# Patient Record
Sex: FEMALE | Race: WHITE | NOT HISPANIC OR LATINO | URBAN - METROPOLITAN AREA
[De-identification: names, ages, dates, MRNs, and addresses within clinical notes are randomized per-mention and may not be internally consistent; named-entity substitution may affect disease eponyms.]

---

## 2021-06-04 ENCOUNTER — OFFICE VISIT (OUTPATIENT)
Dept: URBAN - METROPOLITAN AREA CLINIC 72 | Facility: CLINIC | Age: 71
End: 2021-06-04
Payer: MEDICARE

## 2021-06-04 ENCOUNTER — WEB ENCOUNTER (OUTPATIENT)
Dept: URBAN - METROPOLITAN AREA CLINIC 72 | Facility: CLINIC | Age: 71
End: 2021-06-04

## 2021-06-04 VITALS
TEMPERATURE: 97.8 F | WEIGHT: 150 LBS | SYSTOLIC BLOOD PRESSURE: 144 MMHG | HEART RATE: 85 BPM | RESPIRATION RATE: 18 BRPM | HEIGHT: 66 IN | DIASTOLIC BLOOD PRESSURE: 84 MMHG | BODY MASS INDEX: 24.11 KG/M2

## 2021-06-04 DIAGNOSIS — R13.10 ESOPHAGEAL DYSPHAGIA: ICD-10-CM

## 2021-06-04 PROCEDURE — 99204 OFFICE O/P NEW MOD 45 MIN: CPT | Performed by: INTERNAL MEDICINE

## 2021-06-04 RX ORDER — LEVOTHYROXINE SODIUM 75 UG/1
1 TABLET IN THE MORNING ON AN EMPTY STOMACH TABLET ORAL ONCE A DAY
Status: ACTIVE | COMMUNITY

## 2021-06-04 RX ORDER — ESTRADIOL 0.04 MG/D
1 PATCH TO SKIN PATCH TRANSDERMAL
Status: ACTIVE | COMMUNITY

## 2021-06-04 RX ORDER — ACYCLOVIR 400 MG/1
1 TABLET TABLET ORAL
Status: ACTIVE | COMMUNITY

## 2021-06-04 RX ORDER — SIMVASTATIN 40 MG/1
1 TABLET IN THE EVENING TABLET, FILM COATED ORAL ONCE A DAY
Status: ACTIVE | COMMUNITY

## 2021-06-04 NOTE — HPI-TODAY'S VISIT:
Mrs. Gordillo is a pleasant 70-year-old female who presents as a new patient.  She has a complaint of food getting stuck in her throat.   she reports that she has had issues with reflux for quite some time and has required dilations in the past.  She was dilated by another local gastroenterologist a few years ago.  She was told that she needed a repeat dilation in a few years, she is now due for that.  In addition she notes that she is having difficulty swallowing, this is happening even with her small medications.  She denies any odynophagia.  She would like to arrange EGD with dilation.  Heartburn or reflux-like symptoms currently.

## 2021-07-02 ENCOUNTER — OFFICE VISIT (OUTPATIENT)
Dept: URBAN - METROPOLITAN AREA MEDICAL CENTER 40 | Facility: MEDICAL CENTER | Age: 71
End: 2021-07-02
Payer: MEDICARE

## 2021-07-02 DIAGNOSIS — K31.7 GASTRIC POLYPOSIS: ICD-10-CM

## 2021-07-02 DIAGNOSIS — K22.10 EROSIVE ESOPHAGITIS: ICD-10-CM

## 2021-07-02 DIAGNOSIS — K29.60 ALKALINE REFLUX GASTRITIS: ICD-10-CM

## 2021-07-02 DIAGNOSIS — R13.19 DYSPHAGIA CAUSING PULMONARY ASPIRATION WITH SWALLOWING: ICD-10-CM

## 2021-07-02 DIAGNOSIS — K21.9 ACID REFLUX DISEASE: ICD-10-CM

## 2021-07-02 PROCEDURE — 43239 EGD BIOPSY SINGLE/MULTIPLE: CPT | Performed by: INTERNAL MEDICINE

## 2021-07-15 ENCOUNTER — OFFICE VISIT (OUTPATIENT)
Dept: URBAN - METROPOLITAN AREA CLINIC 72 | Facility: CLINIC | Age: 71
End: 2021-07-15
Payer: MEDICARE

## 2021-07-15 VITALS
WEIGHT: 147 LBS | RESPIRATION RATE: 19 BRPM | HEART RATE: 56 BPM | HEIGHT: 66 IN | SYSTOLIC BLOOD PRESSURE: 136 MMHG | TEMPERATURE: 97.5 F | DIASTOLIC BLOOD PRESSURE: 73 MMHG | BODY MASS INDEX: 23.63 KG/M2

## 2021-07-15 DIAGNOSIS — Z80.0 FAMILY HISTORY OF COLON CANCER: ICD-10-CM

## 2021-07-15 DIAGNOSIS — K21.00 GASTROESOPHAGEAL REFLUX DISEASE WITH ESOPHAGITIS WITHOUT HEMORRHAGE: ICD-10-CM

## 2021-07-15 DIAGNOSIS — R13.10 ESOPHAGEAL DYSPHAGIA: ICD-10-CM

## 2021-07-15 PROCEDURE — 99214 OFFICE O/P EST MOD 30 MIN: CPT | Performed by: INTERNAL MEDICINE

## 2021-07-15 RX ORDER — LEVOTHYROXINE SODIUM 75 UG/1
1 TABLET IN THE MORNING ON AN EMPTY STOMACH TABLET ORAL ONCE A DAY
Status: ACTIVE | COMMUNITY

## 2021-07-15 RX ORDER — ESTRADIOL 0.04 MG/D
1 PATCH TO SKIN PATCH TRANSDERMAL
Status: ACTIVE | COMMUNITY

## 2021-07-15 RX ORDER — ACYCLOVIR 400 MG/1
1 TABLET TABLET ORAL
Status: ACTIVE | COMMUNITY

## 2021-07-15 RX ORDER — SIMVASTATIN 40 MG/1
1 TABLET IN THE EVENING TABLET, FILM COATED ORAL ONCE A DAY
Status: ACTIVE | COMMUNITY

## 2021-07-15 NOTE — HPI-TODAY'S VISIT:
Mrs. Gordillo returns for follow-up.  She is a pleasant 70-year-old female last seen in her office on 6/4/2021 with dysphagia.  Past medical history of reflux and dysphagia requiring dilations in the past.  Given her symptoms we arranged for EGD with dilation.  EGD was done without difficulty on 7/2/2021 for esophagitis at the GE junction noted to be at 39 cm from the incisors, there were plaque-like lesions suggestive of Candida, these were sampled.  Multiple gastric polyps and mild antral gastritis and a normal duodenum, no dilation was performed.  she reports that she has been taking pantoprazole 40 mg twice a day, this was not listed on her medication list.  She was taking this when she had her endoscopy.  She has been on twice date daily dosing for quite some time.  This was started by her other gastroenterologist.  She reports things are getting better, she is not having as much difficulty swallowing.  She still notes occasional reflux.  Pathology: Unremarkable small bowel, inactive gastritis, fundic gland polyps, active erosive esophagitis with no viral or fungal elements and no intestinal metaplasia.  Her last colonoscopy was in 2017 significant for a colon polyp and diverticulosis, recommended repeat in 5 years, has a family history of colon cancer in her brother.

## 2022-04-20 ENCOUNTER — OFFICE VISIT (OUTPATIENT)
Dept: URBAN - METROPOLITAN AREA CLINIC 72 | Facility: CLINIC | Age: 72
End: 2022-04-20
Payer: MEDICARE

## 2022-04-20 ENCOUNTER — LAB OUTSIDE AN ENCOUNTER (OUTPATIENT)
Dept: URBAN - METROPOLITAN AREA CLINIC 72 | Facility: CLINIC | Age: 72
End: 2022-04-20

## 2022-04-20 VITALS
TEMPERATURE: 98.3 F | DIASTOLIC BLOOD PRESSURE: 76 MMHG | BODY MASS INDEX: 22.82 KG/M2 | WEIGHT: 142 LBS | HEART RATE: 70 BPM | SYSTOLIC BLOOD PRESSURE: 138 MMHG | HEIGHT: 66 IN | RESPIRATION RATE: 18 BRPM

## 2022-04-20 DIAGNOSIS — K21.00 GASTROESOPHAGEAL REFLUX DISEASE WITH ESOPHAGITIS WITHOUT HEMORRHAGE: ICD-10-CM

## 2022-04-20 DIAGNOSIS — Z80.0 FAMILY HISTORY OF COLON CANCER: ICD-10-CM

## 2022-04-20 PROCEDURE — 99214 OFFICE O/P EST MOD 30 MIN: CPT | Performed by: INTERNAL MEDICINE

## 2022-04-20 RX ORDER — ACYCLOVIR 400 MG/1
1 TABLET TABLET ORAL
Status: ACTIVE | COMMUNITY

## 2022-04-20 RX ORDER — LEVOTHYROXINE SODIUM 75 UG/1
1 TABLET IN THE MORNING ON AN EMPTY STOMACH TABLET ORAL ONCE A DAY
Status: ACTIVE | COMMUNITY

## 2022-04-20 RX ORDER — MIRABEGRON 50 MG/1
1 TABLET TABLET, FILM COATED, EXTENDED RELEASE ORAL ONCE A DAY
Status: ACTIVE | COMMUNITY

## 2022-04-20 RX ORDER — SIMVASTATIN 40 MG/1
1 TABLET IN THE EVENING TABLET, FILM COATED ORAL ONCE A DAY
Status: ACTIVE | COMMUNITY

## 2022-04-20 RX ORDER — ESTRADIOL 0.04 MG/D
1 PATCH TO SKIN PATCH TRANSDERMAL
Status: ACTIVE | COMMUNITY

## 2022-04-20 NOTE — HPI-TODAY'S VISIT:
Mrs. Gordillo returns for follow-up.  She was last seen in our office on 7/15/2021.  She has history of GERD with dysphagia requiring dilatations.  EGD done 7/2/2021 had esophagitis at the GE junction and plaque-like lesions suggestive of Candida and multiple gastric antral polyps normal duodenum and no dilatation performed.  She was on pantoprazole at that time.  Erosive esophagitis with no viral or fungal elements appreciated no intestinal metaplasia, biopsies.  Colonoscopy 2017 had colon polyp and diverticulosis recommend repeat in 5 years given family history of colon cancer in her brother.  Returns this today after being hospitalized for atypical chest pain lab work from hospitalization normal CBC normal CMP negative cardiac work-up.  A chest x-ray performed showed no acute findings.   She reports atypical chest pain, again cardiac work-up negative.  Having occasional intermittent dysphagia, increased belching and bloating.  She is taking a PPI although not listed on her medication list today.  Just takes famotidine at night.  She is due for colonoscopy.  She would like to arrange bidirectional endoscopy with possible dilatation.  She is making dietary and lifestyle modifications with some improvement of her symptoms overall.

## 2022-05-25 ENCOUNTER — OFFICE VISIT (OUTPATIENT)
Dept: URBAN - METROPOLITAN AREA MEDICAL CENTER 40 | Facility: MEDICAL CENTER | Age: 72
End: 2022-05-25
Payer: MEDICARE

## 2022-05-25 DIAGNOSIS — K21.9 ACID REFLUX: ICD-10-CM

## 2022-05-25 DIAGNOSIS — K44.9 DIAPHRAGMATIC HERNIA ASSOCIATED WITH MUTATION IN ZFPM2 GENE: ICD-10-CM

## 2022-05-25 DIAGNOSIS — K22.10 EROSIVE ESOPHAGITIS: ICD-10-CM

## 2022-05-25 DIAGNOSIS — R13.19 DYSPHAGIA: ICD-10-CM

## 2022-05-25 DIAGNOSIS — D12.2 ADENOMA OF ASCENDING COLON: ICD-10-CM

## 2022-05-25 DIAGNOSIS — K57.30 ACQUIRED DIVERTICULOSIS OF COLON: ICD-10-CM

## 2022-05-25 DIAGNOSIS — Z80.0 FAMILY HISTORY MALIGNANT NEOPLASM OF BILIARY TRACT: ICD-10-CM

## 2022-05-25 DIAGNOSIS — K31.7 BENIGN POLYP OF DUODENUM: ICD-10-CM

## 2022-05-25 DIAGNOSIS — Z86.010 COLON POLYP HISTORY: ICD-10-CM

## 2022-05-25 PROCEDURE — 43239 EGD BIOPSY SINGLE/MULTIPLE: CPT | Performed by: INTERNAL MEDICINE

## 2022-05-25 PROCEDURE — 45385 COLONOSCOPY W/LESION REMOVAL: CPT | Performed by: INTERNAL MEDICINE

## 2022-05-25 PROCEDURE — 43248 EGD GUIDE WIRE INSERTION: CPT | Performed by: INTERNAL MEDICINE

## 2022-05-25 RX ORDER — LEVOTHYROXINE SODIUM 75 UG/1
1 TABLET IN THE MORNING ON AN EMPTY STOMACH TABLET ORAL ONCE A DAY
Status: ACTIVE | COMMUNITY

## 2022-05-25 RX ORDER — SIMVASTATIN 40 MG/1
1 TABLET IN THE EVENING TABLET, FILM COATED ORAL ONCE A DAY
Status: ACTIVE | COMMUNITY

## 2022-05-25 RX ORDER — ESTRADIOL 0.04 MG/D
1 PATCH TO SKIN PATCH TRANSDERMAL
Status: ACTIVE | COMMUNITY

## 2022-05-25 RX ORDER — ACYCLOVIR 400 MG/1
1 TABLET TABLET ORAL
Status: ACTIVE | COMMUNITY

## 2022-05-25 RX ORDER — MIRABEGRON 50 MG/1
1 TABLET TABLET, FILM COATED, EXTENDED RELEASE ORAL ONCE A DAY
Status: ACTIVE | COMMUNITY

## 2022-06-01 ENCOUNTER — WEB ENCOUNTER (OUTPATIENT)
Dept: URBAN - METROPOLITAN AREA CLINIC 72 | Facility: CLINIC | Age: 72
End: 2022-06-01

## 2022-06-07 ENCOUNTER — OFFICE VISIT (OUTPATIENT)
Dept: URBAN - METROPOLITAN AREA CLINIC 72 | Facility: CLINIC | Age: 72
End: 2022-06-07
Payer: MEDICARE

## 2022-06-07 ENCOUNTER — LAB OUTSIDE AN ENCOUNTER (OUTPATIENT)
Dept: URBAN - METROPOLITAN AREA CLINIC 72 | Facility: CLINIC | Age: 72
End: 2022-06-07

## 2022-06-07 VITALS
BODY MASS INDEX: 22.98 KG/M2 | SYSTOLIC BLOOD PRESSURE: 123 MMHG | HEART RATE: 63 BPM | DIASTOLIC BLOOD PRESSURE: 74 MMHG | WEIGHT: 143 LBS | TEMPERATURE: 97.9 F | HEIGHT: 66 IN | RESPIRATION RATE: 18 BRPM

## 2022-06-07 DIAGNOSIS — R13.10 ESOPHAGEAL DYSPHAGIA: ICD-10-CM

## 2022-06-07 DIAGNOSIS — D12.2 ADENOMA OF ASCENDING COLON: ICD-10-CM

## 2022-06-07 DIAGNOSIS — K21.00 GASTRO-ESOPHAGEAL REFLUX DISEASE WITH ESOPHAGITIS, WITHOUT BLEEDING: ICD-10-CM

## 2022-06-07 DIAGNOSIS — K57.90 DIVERTICULOSIS: ICD-10-CM

## 2022-06-07 DIAGNOSIS — Z80.0 FAMILY HISTORY OF COLON CANCER: ICD-10-CM

## 2022-06-07 DIAGNOSIS — K44.9 DIAPHRAGMATIC HERNIA WITHOUT OBSTRUCTION OR GANGRENE: ICD-10-CM

## 2022-06-07 PROBLEM — 40890009: Status: ACTIVE | Noted: 2021-06-04

## 2022-06-07 PROCEDURE — 99214 OFFICE O/P EST MOD 30 MIN: CPT | Performed by: INTERNAL MEDICINE

## 2022-06-07 RX ORDER — MIRABEGRON 50 MG/1
1 TABLET TABLET, FILM COATED, EXTENDED RELEASE ORAL ONCE A DAY
Status: ACTIVE | COMMUNITY

## 2022-06-07 RX ORDER — LEVOTHYROXINE SODIUM 75 UG/1
1 TABLET IN THE MORNING ON AN EMPTY STOMACH TABLET ORAL ONCE A DAY
Status: ACTIVE | COMMUNITY

## 2022-06-07 RX ORDER — SIMVASTATIN 40 MG/1
1 TABLET IN THE EVENING TABLET, FILM COATED ORAL ONCE A DAY
Status: ACTIVE | COMMUNITY

## 2022-06-07 RX ORDER — PANTOPRAZOLE SODIUM 40 MG/1
1 TABLET TABLET, DELAYED RELEASE ORAL TWICE A DAY
Status: ACTIVE | COMMUNITY

## 2022-06-07 RX ORDER — FAMOTIDINE 40 MG/1
1 TABLET AT BEDTIME TABLET, FILM COATED ORAL ONCE A DAY
Status: ACTIVE | COMMUNITY

## 2022-06-07 RX ORDER — ACYCLOVIR 400 MG/1
1 TABLET TABLET ORAL
Status: ACTIVE | COMMUNITY

## 2022-06-07 RX ORDER — PANTOPRAZOLE SODIUM 40 MG/1
1 TABLET TABLET, DELAYED RELEASE ORAL ONCE A DAY
Qty: 90 | Refills: 1

## 2022-06-07 RX ORDER — ESTRADIOL 0.04 MG/D
1 PATCH TO SKIN PATCH TRANSDERMAL
Status: ACTIVE | COMMUNITY

## 2022-06-07 NOTE — HPI-TODAY'S VISIT:
Mrs. Gordillo is a pleasant 71-year-old who returns for follow-up, she was last seen in our office on 4/20/2022.  Recall she has a history of GERD, dysphagia, colon polyps, diverticulosis and a family history of colorectal cancer.  She was hospitalized with atypical chest pain earlier this year had negative cardiac work-up and normal blood work.  She reported belching, bloating and intermittent dysphagia and atypical chest pain, was placed on PPI and famotidine at night.  She noted this was helping and dietary and lifestyle modifications were also improving.  She is due for bidirectional endoscopy and thus we arrange this for evaluation.  EGD and colonoscopy performed 5/25/2022 completed without difficulty significant for diminutive fundic gland polyps, a 1 to 2 cm sliding hiatal hernia and a normal appearing GE junction however she was empirically dilated to 18 mm.  Gastric and GE junction biopsies were taken.  Colonoscopy completed through the terminal ileum with excellent bowel preparation was significant for a 4 mm ascending colon polyp, pancolonic diverticulosis and no internal hemorrhoids.  Pathology returned showing fundic gland polyp, erosive esophagitis, and the ascending colon was adenomatous.  Given her family history is recommended that she undergo repeat colonoscopy in 5 years.  She reports that she has been taking pantoprazole 40 mg in the morning, 40 mg in the evening and a 40 mg famotidine also in the evening, she was on this when she had her EGD. No dysphagia or burning since the procedure.

## 2022-06-08 ENCOUNTER — TELEPHONE ENCOUNTER (OUTPATIENT)
Dept: URBAN - METROPOLITAN AREA CLINIC 113 | Facility: CLINIC | Age: 72
End: 2022-06-08

## 2022-06-08 PROBLEM — 266433003: Status: ACTIVE | Noted: 2022-06-07

## 2022-07-28 ENCOUNTER — TELEPHONE ENCOUNTER (OUTPATIENT)
Dept: URBAN - METROPOLITAN AREA CLINIC 72 | Facility: CLINIC | Age: 72
End: 2022-07-28

## 2022-12-07 ENCOUNTER — OFFICE VISIT (OUTPATIENT)
Dept: URBAN - METROPOLITAN AREA CLINIC 72 | Facility: CLINIC | Age: 72
End: 2022-12-07

## 2022-12-07 PROBLEM — 398050005 DIVERTICULAR DISEASE OF COLON: Status: ACTIVE | Noted: 2022-06-07

## 2022-12-07 RX ORDER — MIRABEGRON 50 MG/1
1 TABLET TABLET, FILM COATED, EXTENDED RELEASE ORAL ONCE A DAY
Status: ACTIVE | COMMUNITY

## 2022-12-07 RX ORDER — LEVOTHYROXINE SODIUM 75 UG/1
1 TABLET IN THE MORNING ON AN EMPTY STOMACH TABLET ORAL ONCE A DAY
Status: ACTIVE | COMMUNITY

## 2022-12-07 RX ORDER — FAMOTIDINE 40 MG/1
1 TABLET AT BEDTIME TABLET, FILM COATED ORAL ONCE A DAY
Status: ACTIVE | COMMUNITY

## 2022-12-07 RX ORDER — SIMVASTATIN 40 MG/1
1 TABLET IN THE EVENING TABLET, FILM COATED ORAL ONCE A DAY
Status: ACTIVE | COMMUNITY

## 2022-12-07 RX ORDER — PANTOPRAZOLE SODIUM 40 MG/1
1 TABLET TABLET, DELAYED RELEASE ORAL ONCE A DAY
Qty: 90 | Refills: 1 | Status: DISCONTINUED | COMMUNITY

## 2022-12-07 RX ORDER — ESTRADIOL 0.04 MG/D
1 PATCH TO SKIN PATCH TRANSDERMAL
Status: ACTIVE | COMMUNITY

## 2022-12-07 RX ORDER — ACYCLOVIR 400 MG/1
1 TABLET TABLET ORAL
Status: ACTIVE | COMMUNITY

## 2022-12-07 NOTE — HPI-TODAY'S VISIT:
Mrs. Gordillo returns for follow-up, recall she is a 71-year-old last seen in office on 6/7/2022.  She has a history of GERD, dysphagia, colon polyps, diverticulosis and a family history of colorectal cancer. 9 she had atypical chest pain with negative cardiac work-up done during hospitalization.  Had issues with belching bloating and intermittent dysphagia along with chest pain despite PPI use and famotidine.  Bidirectional endoscopy was performed in May 2022 had diminutive fundic gland polyps a 1 to 2 cm sliding hiatal hernia, normal GE junction was empirically dilated to 18 mm.  Biopsies returned showing fundic gland polyp, erosive esophagitis.  Colonoscopy through terminal ileum with excellent bowel preparation did have an ascending colon polyp and diverticulosis.  Polyp did return as adenomatous.  Due for repeat in 5 years. We last saw her she had been on omeprazole 40 mg twice daily and famotidine 40 mg nightly and reported resolution of her symptoms.  We encouraged her to decrease her PPI use if able but continue dual therapy with pantoprazole and famotidine.  We also provided her with information on TIF.  Esophageal manometry was ordered and referral was sent to surgery we last spoke with her she was still deciding on which surgeon to see.  It appears that she had a robotic Nissen fundoplication performed by Dr. Choudhury on 9/12/2022.  She had a small paraesophageal hiatal hernia containing paraesophageal lipoma and paraesophageal adhesions.

## 2023-04-11 ENCOUNTER — OFFICE VISIT (OUTPATIENT)
Dept: URBAN - METROPOLITAN AREA CLINIC 72 | Facility: CLINIC | Age: 73
End: 2023-04-11
Payer: MEDICARE

## 2023-04-11 ENCOUNTER — LAB OUTSIDE AN ENCOUNTER (OUTPATIENT)
Dept: URBAN - METROPOLITAN AREA CLINIC 72 | Facility: CLINIC | Age: 73
End: 2023-04-11

## 2023-04-11 VITALS
SYSTOLIC BLOOD PRESSURE: 131 MMHG | HEIGHT: 66 IN | WEIGHT: 123.2 LBS | DIASTOLIC BLOOD PRESSURE: 69 MMHG | RESPIRATION RATE: 18 BRPM | HEART RATE: 82 BPM | BODY MASS INDEX: 19.8 KG/M2 | TEMPERATURE: 97.6 F

## 2023-04-11 DIAGNOSIS — R13.19 ESOPHAGEAL DYSPHAGIA: ICD-10-CM

## 2023-04-11 DIAGNOSIS — Z87.19 PERSONAL HISTORY OF OTHER DISEASES OF THE DIGESTIVE SYSTEM: ICD-10-CM

## 2023-04-11 DIAGNOSIS — Z98.890 OTHER SPECIFIED POSTPROCEDURAL STATES: ICD-10-CM

## 2023-04-11 PROCEDURE — 99214 OFFICE O/P EST MOD 30 MIN: CPT | Performed by: INTERNAL MEDICINE

## 2023-04-11 RX ORDER — SIMVASTATIN 40 MG/1
1 TABLET IN THE EVENING TABLET, FILM COATED ORAL ONCE A DAY
Status: ACTIVE | COMMUNITY

## 2023-04-11 RX ORDER — LEVOTHYROXINE SODIUM 75 UG/1
1 TABLET IN THE MORNING ON AN EMPTY STOMACH TABLET ORAL ONCE A DAY
Status: ACTIVE | COMMUNITY

## 2023-04-11 RX ORDER — FAMOTIDINE 40 MG/1
1 TABLET AT BEDTIME TABLET, FILM COATED ORAL ONCE A DAY
Status: DISCONTINUED | COMMUNITY

## 2023-04-11 RX ORDER — ESTRADIOL 0.04 MG/D
1 PATCH TO SKIN PATCH TRANSDERMAL
Status: ACTIVE | COMMUNITY

## 2023-04-11 RX ORDER — MIRABEGRON 50 MG/1
1 TABLET TABLET, FILM COATED, EXTENDED RELEASE ORAL ONCE A DAY
Status: ACTIVE | COMMUNITY

## 2023-04-11 RX ORDER — ACYCLOVIR 400 MG/1
1 TABLET TABLET ORAL
Status: ACTIVE | COMMUNITY

## 2023-04-11 NOTE — HPI-TODAY'S VISIT:
Mrs. Gordillo returns for follow-up, she was last seen in our office on 6/7/2022.  She has a history of GERD with dysphagia, colon polyps, diverticulosis and a family history of colon cancer.  She has had atypical chest pain was hospitalized in 2022 with negative cardiac work-up at that time.  We last saw her in June 2022 she was having intermittent dysphagia atypical chest pain despite PPI and famotidine usage.  She underwent bidirectional endoscopy 5/25/2022 that showed diminutive fundic gland polyps a 1 to 2 cm sliding hiatal hernia empirically dilated to 18 mm with savory dilator.  Colonoscopy completed through the terminal ileum with excellent bowel preparation had a 4 mm ascending colon polyp, pancolonic diverticulosis.  Biopsies from upper endoscopy confirm fundic gland polyp, erosive esophagitis, the ascending colon polyp was found to be adenomatous.  Given her family history of colon cancer and her history of polyps a repeat colonoscopy is recommended in 5 years.  She was taking 40 mg PPI twice daily in addition to 40 mg famotidine nightly, she was on this dosage when she had her EGD done that confirmed esophagitis.  Based on these findings we encouraged her to undergo manometry and potential fixation of her hiatal hernia/antireflux surgery. She had this done in sept 2022.  She reports she has been doing well since the hiatal hernia repair however she developed progressive dysphagia, initially with starchy foods now with meats.  She has cut out starchy foods from her diet because they cannot go down her esophagus.  She notes that now with meats and tuna she feels like they get stuck in her midesophagus, she will drink 16 ounces of water to get them to go down.  She still has episodes with intermittent loose stools well-controlled with her current regimen of antidiarrheals as needed.  They are not frequent and have not changed.  All in all she feels well today but would like to arrange an EGD given her dysphagia.

## 2023-04-13 ENCOUNTER — OFFICE VISIT (OUTPATIENT)
Dept: URBAN - METROPOLITAN AREA MEDICAL CENTER 40 | Facility: MEDICAL CENTER | Age: 73
End: 2023-04-13
Payer: MEDICARE

## 2023-04-13 DIAGNOSIS — R13.19 ESOPHAGEAL DYSPHAGIA: ICD-10-CM

## 2023-04-13 DIAGNOSIS — Z93.1 GASTROSTOMY IN PLACE: ICD-10-CM

## 2023-04-13 DIAGNOSIS — Z98.890 HISTORY OF NISSEN FUNDOPLICATION: ICD-10-CM

## 2023-04-13 PROCEDURE — 43248 EGD GUIDE WIRE INSERTION: CPT | Performed by: INTERNAL MEDICINE

## 2023-04-19 ENCOUNTER — TELEPHONE ENCOUNTER (OUTPATIENT)
Dept: URBAN - METROPOLITAN AREA CLINIC 72 | Facility: CLINIC | Age: 73
End: 2023-04-19

## 2023-05-02 ENCOUNTER — OFFICE VISIT (OUTPATIENT)
Dept: URBAN - METROPOLITAN AREA CLINIC 72 | Facility: CLINIC | Age: 73
End: 2023-05-02

## 2023-05-02 VITALS — HEIGHT: 66 IN

## 2023-05-02 RX ORDER — ACYCLOVIR 400 MG/1
1 TABLET TABLET ORAL
Status: ACTIVE | COMMUNITY

## 2023-05-02 RX ORDER — SIMVASTATIN 40 MG/1
1 TABLET IN THE EVENING TABLET, FILM COATED ORAL ONCE A DAY
Status: ACTIVE | COMMUNITY

## 2023-05-02 RX ORDER — MIRABEGRON 50 MG/1
1 TABLET TABLET, FILM COATED, EXTENDED RELEASE ORAL ONCE A DAY
Status: ACTIVE | COMMUNITY

## 2023-05-02 RX ORDER — ESTRADIOL 0.04 MG/D
1 PATCH TO SKIN PATCH TRANSDERMAL
Status: ACTIVE | COMMUNITY

## 2023-05-02 RX ORDER — LEVOTHYROXINE SODIUM 75 UG/1
1 TABLET IN THE MORNING ON AN EMPTY STOMACH TABLET ORAL ONCE A DAY
Status: ACTIVE | COMMUNITY

## 2023-05-02 NOTE — HPI-TODAY'S VISIT:
72-year-old female here for EGD follow-up.  Last seen 4/11/2023 for esophageal dysphagia and diarrhea.  EGD with dilatation ordered.  Diarrhea controlled with antidiarrheal medicines as needed.  She did call several days after dilatation reported odynophagia which was improving.    EGD 4/13/2023 revealed normal esophagus, post gastric surgery.  Prior Niesen fundoplication noted.  Guidewire dilatation performed 17 mm.  Normal duodenum.   She has a history of GERD with dysphagia, colon polyps, diverticulosis and a family history of colon cancer.  She has had atypical chest pain was hospitalized in 2022 with negative cardiac work-up at that time.  We last saw her in June 2022 she was having intermittent dysphagia atypical chest pain despite PPI and famotidine usage.  She underwent bidirectional endoscopy 5/25/2022 that showed diminutive fundic gland polyps a 1 to 2 cm sliding hiatal hernia empirically dilated to 18 mm with savory dilator.  Colonoscopy completed through the terminal ileum with excellent bowel preparation had a 4 mm ascending colon polyp, pancolonic diverticulosis.  Biopsies from upper endoscopy confirm fundic gland polyp, erosive esophagitis, the ascending colon polyp was found to be adenomatous.  Given her family history of colon cancer and her history of polyps a repeat colonoscopy is recommended in 5 years.  She was taking 40 mg PPI twice daily in addition to 40 mg famotidine nightly, she was on this dosage when she had her EGD done that confirmed esophagitis.  Based on these findings we encouraged her to undergo manometry and potential fixation of her hiatal hernia/antireflux surgery. She had this done in sept 2022.  Last note:  She reports she has been doing well since the hiatal hernia repair however she developed progressive dysphagia, initially with starchy foods now with meats.  She has cut out starchy foods from her diet because they cannot go down her esophagus.  She notes that now with meats and tuna she feels like they get stuck in her midesophagus, she will drink 16 ounces of water to get them to go down.  She still has episodes with intermittent loose stools well-controlled with her current regimen of antidiarrheals as needed.  They are not frequent and have not changed.  All in all she feels well today but would like to arrange an EGD given her dysphagia.

## 2023-12-21 ENCOUNTER — LAB OUTSIDE AN ENCOUNTER (OUTPATIENT)
Dept: URBAN - METROPOLITAN AREA CLINIC 72 | Facility: CLINIC | Age: 73
End: 2023-12-21

## 2023-12-21 ENCOUNTER — CLAIMS CREATED FROM THE CLAIM WINDOW (OUTPATIENT)
Dept: URBAN - METROPOLITAN AREA CLINIC 72 | Facility: CLINIC | Age: 73
End: 2023-12-21
Payer: MEDICARE

## 2023-12-21 VITALS
TEMPERATURE: 97.3 F | HEIGHT: 66 IN | DIASTOLIC BLOOD PRESSURE: 76 MMHG | WEIGHT: 128.8 LBS | HEART RATE: 62 BPM | SYSTOLIC BLOOD PRESSURE: 132 MMHG | BODY MASS INDEX: 20.7 KG/M2

## 2023-12-21 DIAGNOSIS — Z86.010 HISTORY OF COLON POLYPS: ICD-10-CM

## 2023-12-21 DIAGNOSIS — R13.10 ODYNOPHAGIA: ICD-10-CM

## 2023-12-21 DIAGNOSIS — R19.7 ACUTE DIARRHEA: ICD-10-CM

## 2023-12-21 DIAGNOSIS — R13.12 OROPHARYNGEAL DYSPHAGIA: ICD-10-CM

## 2023-12-21 DIAGNOSIS — K44.9 DIAPHRAGMATIC HERNIA WITHOUT OBSTRUCTION OR GANGRENE: ICD-10-CM

## 2023-12-21 DIAGNOSIS — R19.7 DIARRHEA, UNSPECIFIED TYPE: ICD-10-CM

## 2023-12-21 PROBLEM — 428283002: Status: ACTIVE | Noted: 2023-12-21

## 2023-12-21 PROBLEM — 30233002: Status: ACTIVE | Noted: 2023-12-21

## 2023-12-21 PROBLEM — 71457002: Status: ACTIVE | Noted: 2023-12-21

## 2023-12-21 PROCEDURE — 99214 OFFICE O/P EST MOD 30 MIN: CPT | Performed by: NURSE PRACTITIONER

## 2023-12-21 RX ORDER — ACYCLOVIR 400 MG/1
1 TABLET TABLET ORAL
Status: ON HOLD | COMMUNITY

## 2023-12-21 RX ORDER — LEVOTHYROXINE SODIUM 75 UG/1
1 TABLET IN THE MORNING ON AN EMPTY STOMACH TABLET ORAL ONCE A DAY
Status: ACTIVE | COMMUNITY

## 2023-12-21 RX ORDER — MIRABEGRON 50 MG/1
1 TABLET TABLET, FILM COATED, EXTENDED RELEASE ORAL ONCE A DAY
Status: ACTIVE | COMMUNITY

## 2023-12-21 RX ORDER — SIMVASTATIN 40 MG/1
1 TABLET IN THE EVENING TABLET, FILM COATED ORAL ONCE A DAY
Status: ACTIVE | COMMUNITY

## 2023-12-21 RX ORDER — ESTRADIOL 0.04 MG/D
1 PATCH TO SKIN PATCH TRANSDERMAL
Status: ACTIVE | COMMUNITY

## 2023-12-21 NOTE — HPI-OTHER HISTORIES
-Bidirectional endoscopy 5/25/2022 that showed diminutive fundic gland polyps a 1 to 2 cm sliding hiatal hernia empirically dilated to 18 mm with savory dilator. Colonoscopy completed through the terminal ileum with excellent bowel preparation had a 4 mm ascending colon polyp, pancolonic diverticulosis. Biopsies from upper endoscopy confirm fundic gland polyp, erosive esophagitis, the ascending colon polyp was found to be adenomatous. Given her family history of colon cancer and her history of polyps a repeat colonoscopy is recommended in 5 years. -EGD 4/13/2023 revealed normal esophagus, post gastric surgery. Prior Nissen fundoplication noted. Guidewire dilatation performed 17 mm. Normal duodenum.

## 2023-12-21 NOTE — HPI-TODAY'S VISIT:
73-year-old female here for diarrhea.  Has an upcoming appointment 1/2/2024 with Dr. Mckoy for swallowing issues. She is here today for diarrhea.    Last seen 4/11/2023 for diarrhea, dysphagia and abdominal cramping.  EGD with dilatation ordered.  Diarrhea controlled with current regimen of antidiarrheals as needed.  She has a history of GERD with dysphagia, colon polyps, diverticulosis and a family history of colon cancer.   She was taking 40 mg PPI twice daily in addition to 40 mg famotidine nightly, she was on this dosage when she had her EGD done that confirmed esophagitis.  Based on these findings we encouraged her to undergo manometry and potential fixation of her hiatal hernia/antireflux surgery. She had this done in sept 2022.  She reports diarrhea started a month ago. She has discovered it was the the sugar-free yazmin and she has since cut it out.  It gave her a lot of flatus also. Was having some black in her stool-was taking pepto-bismol at the time. She is off the pepto-bismol and that has since resolved.  Last BM was yesterday and it was formed. Denies BRBPR. She reports dysphagia to upper esophagus and it hurts to swallow her own saliva in her throat area.  This started less than 2 months ago. She has stopped eating beef and notices chicken makes it worse. Coughs morning and evening.  She is not on PPI or H2RA.  She is up 5 pounds from her last appointment.  Getting surgery on right shoulder 1/3/24.  Labs with Dr. Baires yesterday for upcoming annual appointment.

## 2024-01-02 ENCOUNTER — OFFICE VISIT (OUTPATIENT)
Dept: URBAN - METROPOLITAN AREA CLINIC 72 | Facility: CLINIC | Age: 74
End: 2024-01-02
Payer: MEDICARE

## 2024-01-02 VITALS
TEMPERATURE: 97.3 F | WEIGHT: 126 LBS | BODY MASS INDEX: 20.25 KG/M2 | HEART RATE: 75 BPM | SYSTOLIC BLOOD PRESSURE: 145 MMHG | DIASTOLIC BLOOD PRESSURE: 81 MMHG | HEIGHT: 66 IN

## 2024-01-02 DIAGNOSIS — R19.7 ACUTE DIARRHEA: ICD-10-CM

## 2024-01-02 DIAGNOSIS — R13.10 ODYNOPHAGIA: ICD-10-CM

## 2024-01-02 PROCEDURE — 99213 OFFICE O/P EST LOW 20 MIN: CPT | Performed by: INTERNAL MEDICINE

## 2024-01-02 RX ORDER — ESTRADIOL 0.04 MG/D
1 PATCH TO SKIN PATCH TRANSDERMAL
Status: ACTIVE | COMMUNITY

## 2024-01-02 RX ORDER — SIMVASTATIN 40 MG/1
1 TABLET IN THE EVENING TABLET, FILM COATED ORAL ONCE A DAY
Status: ACTIVE | COMMUNITY

## 2024-01-02 RX ORDER — LEVOTHYROXINE SODIUM 75 UG/1
1 TABLET IN THE MORNING ON AN EMPTY STOMACH TABLET ORAL ONCE A DAY
Status: ACTIVE | COMMUNITY

## 2024-01-02 RX ORDER — ACYCLOVIR 400 MG/1
1 TABLET TABLET ORAL
Status: ON HOLD | COMMUNITY

## 2024-01-02 RX ORDER — SERTRALINE HYDROCHLORIDE 25 MG/1
1 TABLET TABLET, FILM COATED ORAL ONCE A DAY
Status: ACTIVE | COMMUNITY

## 2024-01-02 RX ORDER — MIRABEGRON 50 MG/1
1 TABLET TABLET, FILM COATED, EXTENDED RELEASE ORAL ONCE A DAY
Status: ACTIVE | COMMUNITY

## 2024-01-02 RX ORDER — MELOXICAM 7.5 MG/1
TABLET ORAL
Qty: 90 TABLET | Status: ACTIVE | COMMUNITY

## 2024-01-02 NOTE — EXAM-GENERAL EXAMINATION
General--no acute distress, resting comfortably Eyes--anicteric, no pallor HENT--normocephalic, atraumatic head Neck--no lymphadenopathy, non tender Chest--non labored breathing, equal rise Abdomen--soft, non tender, non distended, no organomegaly Ext: ANGELINA, no obvious sores or rashes Psych: appropriate mood and affect Neuro--alert and oriented, answers appropriately

## 2024-01-02 NOTE — HPI-TODAY'S VISIT:
Mrs. Gordillo is a pleasant 73-year-old female who returns for follow-up, she was last seen in office on 12/21/2023.  Been following her for diarrhea and dysphagia.  She is typically been controlling her diarrhea with antidiarrheals as needed.  She has had GERD with dysphagia, colon polyps and a family history of colon cancer.EGD 2023 showed prior Nissen fundoplication empirically dilated 17 mm guidewire dilation.  Colonoscopy done in 2022 had an adenomatous ascending colon polyp. For her diarrhea low FODMAP diet was recommended, for her dysphagia barium swallow and EGD were ordered. 12/28/2023 lab work WBC 5.4, hemoglobin 13.8, hematocrit 42.2, MCV 92  Her diarrhea has subsided.  She did not submit stool studies.She has been unable to swallow the viscous lidocaine solution as it is too thick.She did not hear from radiology so she has had no further imaging performed.  Her symptoms still persist with dysphagia and some mild odynophagia.

## 2024-03-22 ENCOUNTER — EGD (OUTPATIENT)
Dept: URBAN - METROPOLITAN AREA MEDICAL CENTER 40 | Facility: MEDICAL CENTER | Age: 74
End: 2024-03-22

## 2024-03-22 RX ORDER — SIMVASTATIN 40 MG/1
1 TABLET IN THE EVENING TABLET, FILM COATED ORAL ONCE A DAY
Status: ACTIVE | COMMUNITY

## 2024-03-22 RX ORDER — LEVOTHYROXINE SODIUM 75 UG/1
1 TABLET IN THE MORNING ON AN EMPTY STOMACH TABLET ORAL ONCE A DAY
Status: ACTIVE | COMMUNITY

## 2024-03-22 RX ORDER — ACYCLOVIR 400 MG/1
1 TABLET TABLET ORAL
Status: ON HOLD | COMMUNITY

## 2024-03-22 RX ORDER — ESTRADIOL 0.04 MG/D
1 PATCH TO SKIN PATCH TRANSDERMAL
Status: ACTIVE | COMMUNITY

## 2024-03-22 RX ORDER — MELOXICAM 7.5 MG/1
TABLET ORAL
Qty: 90 TABLET | Status: ACTIVE | COMMUNITY

## 2024-03-22 RX ORDER — MIRABEGRON 50 MG/1
1 TABLET TABLET, FILM COATED, EXTENDED RELEASE ORAL ONCE A DAY
Status: ACTIVE | COMMUNITY

## 2024-03-22 RX ORDER — SERTRALINE HYDROCHLORIDE 25 MG/1
1 TABLET TABLET, FILM COATED ORAL ONCE A DAY
Status: ACTIVE | COMMUNITY

## 2024-05-21 ENCOUNTER — OFFICE VISIT (OUTPATIENT)
Dept: URBAN - METROPOLITAN AREA CLINIC 72 | Facility: CLINIC | Age: 74
End: 2024-05-21
Payer: MEDICARE

## 2024-05-21 ENCOUNTER — DASHBOARD ENCOUNTERS (OUTPATIENT)
Age: 74
End: 2024-05-21

## 2024-05-21 VITALS
HEIGHT: 66 IN | TEMPERATURE: 97.9 F | SYSTOLIC BLOOD PRESSURE: 126 MMHG | WEIGHT: 141.6 LBS | DIASTOLIC BLOOD PRESSURE: 73 MMHG | HEART RATE: 64 BPM | BODY MASS INDEX: 22.76 KG/M2

## 2024-05-21 DIAGNOSIS — R13.12 OROPHARYNGEAL DYSPHAGIA: ICD-10-CM

## 2024-05-21 PROCEDURE — 99213 OFFICE O/P EST LOW 20 MIN: CPT | Performed by: INTERNAL MEDICINE

## 2024-05-21 RX ORDER — ACYCLOVIR 400 MG/1
1 TABLET TABLET ORAL
Status: ON HOLD | COMMUNITY

## 2024-05-21 RX ORDER — MIRABEGRON 50 MG/1
1 TABLET TABLET, FILM COATED, EXTENDED RELEASE ORAL ONCE A DAY
Status: ACTIVE | COMMUNITY

## 2024-05-21 RX ORDER — SERTRALINE HYDROCHLORIDE 25 MG/1
1 TABLET TABLET, FILM COATED ORAL ONCE A DAY
Status: ACTIVE | COMMUNITY

## 2024-05-21 RX ORDER — ESTRADIOL 0.04 MG/D
1 PATCH TO SKIN PATCH TRANSDERMAL
Status: ACTIVE | COMMUNITY

## 2024-05-21 RX ORDER — LEVOTHYROXINE SODIUM 75 UG/1
1 TABLET IN THE MORNING ON AN EMPTY STOMACH TABLET ORAL ONCE A DAY
Status: ACTIVE | COMMUNITY

## 2024-05-21 RX ORDER — MELOXICAM 7.5 MG/1
TABLET ORAL
Qty: 90 TABLET | Status: ON HOLD | COMMUNITY

## 2024-05-21 RX ORDER — SIMVASTATIN 40 MG/1
1 TABLET IN THE EVENING TABLET, FILM COATED ORAL ONCE A DAY
Status: ACTIVE | COMMUNITY

## 2025-05-13 ENCOUNTER — LAB OUTSIDE AN ENCOUNTER (OUTPATIENT)
Dept: URBAN - METROPOLITAN AREA CLINIC 72 | Facility: CLINIC | Age: 75
End: 2025-05-13

## 2025-05-13 ENCOUNTER — OFFICE VISIT (OUTPATIENT)
Dept: URBAN - METROPOLITAN AREA CLINIC 72 | Facility: CLINIC | Age: 75
End: 2025-05-13
Payer: MEDICARE

## 2025-05-13 DIAGNOSIS — D12.2 ADENOMA OF ASCENDING COLON: ICD-10-CM

## 2025-05-13 DIAGNOSIS — R13.12 OROPHARYNGEAL DYSPHAGIA: ICD-10-CM

## 2025-05-13 DIAGNOSIS — R13.10 ESOPHAGEAL DYSPHAGIA: ICD-10-CM

## 2025-05-13 DIAGNOSIS — K44.9 DIAPHRAGMATIC HERNIA WITHOUT OBSTRUCTION OR GANGRENE: ICD-10-CM

## 2025-05-13 PROCEDURE — 99214 OFFICE O/P EST MOD 30 MIN: CPT | Performed by: INTERNAL MEDICINE

## 2025-05-13 RX ORDER — SERTRALINE HYDROCHLORIDE 25 MG/1
1 TABLET TABLET, FILM COATED ORAL ONCE A DAY
Status: ACTIVE | COMMUNITY

## 2025-05-13 RX ORDER — LEVOTHYROXINE SODIUM 75 UG/1
1 TABLET IN THE MORNING ON AN EMPTY STOMACH TABLET ORAL ONCE A DAY
Status: ACTIVE | COMMUNITY

## 2025-05-13 RX ORDER — MELOXICAM 7.5 MG/1
TABLET ORAL
Qty: 90 TABLET | Status: ON HOLD | COMMUNITY

## 2025-05-13 RX ORDER — MIRABEGRON 50 MG/1
1 TABLET TABLET, FILM COATED, EXTENDED RELEASE ORAL ONCE A DAY
Status: ACTIVE | COMMUNITY

## 2025-05-13 RX ORDER — SIMVASTATIN 40 MG/1
1 TABLET IN THE EVENING TABLET, FILM COATED ORAL ONCE A DAY
Status: ACTIVE | COMMUNITY

## 2025-05-13 RX ORDER — ACYCLOVIR 400 MG/1
1 TABLET TABLET ORAL
Status: ON HOLD | COMMUNITY

## 2025-05-13 RX ORDER — ESTRADIOL 0.04 MG/D
1 PATCH TO SKIN PATCH TRANSDERMAL
Status: ACTIVE | COMMUNITY

## 2025-05-13 NOTE — HPI-TODAY'S VISIT:
Mrs. Gordillo returns for follow-up.  Recall she is a 74-year-old last seen in office on 5/21/2024.  Recall we have been following her for dysphagia. At last office visit she was status post dilatation with no improvement of symptoms.  Recommended consideration of evaluation by ENT.  Normal barium swallow in February 2024.  She underwent EGD 3/22/2024 which was empirically dilated to 18 mm.  She does have a Nissen which was intact.  Last colonoscopy 2022 due for repeat 2027, as family history of colon cancer and history of colon polyps.  She was now referred back to us for ongoing issues with dysphagia by nurse practitioner Yady Saleh, This consultation will be forwarded to the referring practitioner.  She reports issues with swallowing her spit at times.  She will cough mostly at night.  Occasionally has trouble swallowing drier foods again reporting coughing and choking like sensation.  She did get relief after last EGD with dilatation.  She is concerned about the difficulty swallowing her saliva.

## 2025-05-13 NOTE — EXAM-PHYSICAL EXAM
General- no acute distress, resting comfortably Eyes- anicteric, no pallor HENT- normocephalic, atraumatic head Neck- no lymphadenopathy, symmetric Chest- non labored breathing, equal rise Abdomen- soft, non tender, non distended, no organomegaly Ext: ANGELINA, no obvious sores or rashes

## 2025-08-14 ENCOUNTER — OFFICE VISIT (OUTPATIENT)
Dept: URBAN - METROPOLITAN AREA MEDICAL CENTER 40 | Facility: MEDICAL CENTER | Age: 75
End: 2025-08-14
Payer: MEDICARE

## 2025-08-14 DIAGNOSIS — R13.19 OTHER DYSPHAGIA: ICD-10-CM

## 2025-08-14 PROCEDURE — 43248 EGD GUIDE WIRE INSERTION: CPT | Performed by: INTERNAL MEDICINE

## 2025-08-14 RX ORDER — SIMVASTATIN 40 MG/1
1 TABLET IN THE EVENING TABLET, FILM COATED ORAL ONCE A DAY
Status: ACTIVE | COMMUNITY

## 2025-08-14 RX ORDER — ESTRADIOL 0.04 MG/D
1 PATCH TO SKIN PATCH TRANSDERMAL
Status: ACTIVE | COMMUNITY

## 2025-08-14 RX ORDER — MIRABEGRON 50 MG/1
1 TABLET TABLET, FILM COATED, EXTENDED RELEASE ORAL ONCE A DAY
Status: ACTIVE | COMMUNITY

## 2025-08-14 RX ORDER — LEVOTHYROXINE SODIUM 75 UG/1
1 TABLET IN THE MORNING ON AN EMPTY STOMACH TABLET ORAL ONCE A DAY
Status: ACTIVE | COMMUNITY

## 2025-08-14 RX ORDER — ACYCLOVIR 400 MG/1
1 TABLET TABLET ORAL
Status: ON HOLD | COMMUNITY

## 2025-08-14 RX ORDER — SERTRALINE HYDROCHLORIDE 25 MG/1
1 TABLET TABLET, FILM COATED ORAL ONCE A DAY
Status: ACTIVE | COMMUNITY

## 2025-08-14 RX ORDER — MELOXICAM 7.5 MG/1
TABLET ORAL
Qty: 90 TABLET | Status: ON HOLD | COMMUNITY